# Patient Record
Sex: FEMALE | Race: WHITE | Employment: FULL TIME | URBAN - METROPOLITAN AREA
[De-identification: names, ages, dates, MRNs, and addresses within clinical notes are randomized per-mention and may not be internally consistent; named-entity substitution may affect disease eponyms.]

---

## 2022-03-16 LAB — PAP SMEAR, EXTERNAL: POSITIVE

## 2022-08-22 ENCOUNTER — OFFICE VISIT (OUTPATIENT)
Dept: FAMILY MEDICINE CLINIC | Age: 22
End: 2022-08-22
Payer: COMMERCIAL

## 2022-08-22 VITALS
WEIGHT: 149 LBS | HEIGHT: 69 IN | OXYGEN SATURATION: 99 % | DIASTOLIC BLOOD PRESSURE: 74 MMHG | BODY MASS INDEX: 22.07 KG/M2 | SYSTOLIC BLOOD PRESSURE: 104 MMHG | HEART RATE: 62 BPM

## 2022-08-22 DIAGNOSIS — Z00.00 ANNUAL PHYSICAL EXAM: Primary | ICD-10-CM

## 2022-08-22 PROCEDURE — 99385 PREV VISIT NEW AGE 18-39: CPT | Performed by: PHYSICIAN ASSISTANT

## 2022-08-22 SDOH — HEALTH STABILITY: PHYSICAL HEALTH: ON AVERAGE, HOW MANY MINUTES DO YOU ENGAGE IN EXERCISE AT THIS LEVEL?: 60 MIN

## 2022-08-22 SDOH — HEALTH STABILITY: PHYSICAL HEALTH: ON AVERAGE, HOW MANY DAYS PER WEEK DO YOU ENGAGE IN MODERATE TO STRENUOUS EXERCISE (LIKE A BRISK WALK)?: 7 DAYS

## 2022-08-22 ASSESSMENT — PATIENT HEALTH QUESTIONNAIRE - PHQ9
SUM OF ALL RESPONSES TO PHQ QUESTIONS 1-9: 0
SUM OF ALL RESPONSES TO PHQ QUESTIONS 1-9: 0
SUM OF ALL RESPONSES TO PHQ9 QUESTIONS 1 & 2: 0
1. LITTLE INTEREST OR PLEASURE IN DOING THINGS: 0
SUM OF ALL RESPONSES TO PHQ QUESTIONS 1-9: 0
2. FEELING DOWN, DEPRESSED OR HOPELESS: 0
SUM OF ALL RESPONSES TO PHQ QUESTIONS 1-9: 0

## 2022-08-22 NOTE — PROGRESS NOTES
History and Physical      Lilia Jiménez  YOB: 2000    Date of Service:  8/22/2022    Chief Complaint:   Lilia Jiménez is a 20 y.o. female who presents for complete physical examination. HPI: Overall feeling well. Transferring from pediatrician. I have reviewed the patient's medical history in detail and updated the computerized patient record. Wt Readings from Last 3 Encounters:   08/22/22 149 lb (67.6 kg)     BP Readings from Last 3 Encounters:   08/22/22 104/74       There is no problem list on file for this patient. Preventive Care:  Health Maintenance   Topic Date Due    COVID-19 Vaccine (1) Never done    Varicella vaccine (1 of 2 - 2-dose childhood series) Never done    HPV vaccine (1 - 2-dose series) Never done    HIV screen  Never done    Chlamydia screen  Never done    Hepatitis C screen  Never done    DTaP/Tdap/Td vaccine (1 - Tdap) Never done    Pap smear  Never done    Flu vaccine (1) 09/01/2022    Depression Screen  08/22/2023    Meningococcal (ACWY) vaccine  Completed    Hepatitis A vaccine  Aged Out    Hepatitis B vaccine  Aged Out    Hib vaccine  Aged Out    Pneumococcal 0-64 years Vaccine  Aged Out     Hx abnormal PAP: yes - stable and being monitored  Sexual activity: single partner, contraception - OCP (estrogen/progesterone)   Self-breast exams: yes  Last eye exam: spring 2022  Exercise: yes  Seatbelt use: yes  Calcium/vitamin D supplement: no        There is no immunization history on file for this patient. No Known Allergies  Current Outpatient Medications   Medication Sig Dispense Refill    SPRINTEC 28 0.25-35 MG-MCG per tablet        No current facility-administered medications for this visit. History reviewed. No pertinent past medical history.   Past Surgical History:   Procedure Laterality Date    FOOT SURGERY      x 2    TONSILLECTOMY       Family History   Problem Relation Age of Onset    No Known Problems Father     No Known Problems Brother     No Known Problems Brother      Social History     Socioeconomic History    Marital status: Single     Spouse name: Not on file    Number of children: Not on file    Years of education: Not on file    Highest education level: Not on file   Occupational History    Not on file   Tobacco Use    Smoking status: Never    Smokeless tobacco: Never   Vaping Use    Vaping Use: Never used   Substance and Sexual Activity    Alcohol use: Yes     Alcohol/week: 2.0 - 3.0 standard drinks     Types: 2 - 3 Cans of beer per week    Drug use: Never    Sexual activity: Not on file   Other Topics Concern    Not on file   Social History Narrative    Not on file     Social Determinants of Health     Financial Resource Strain: Not on file   Food Insecurity: Not on file   Transportation Needs: Not on file   Physical Activity: Sufficiently Active    Days of Exercise per Week: 7 days    Minutes of Exercise per Session: 60 min   Stress: Not on file   Social Connections: Not on file   Intimate Partner Violence: Not At Risk    Fear of Current or Ex-Partner: No    Emotionally Abused: No    Physically Abused: No    Sexually Abused: No   Housing Stability: Not on file       Review of Systems:  A comprehensive review of systems was negative except for what was noted in the HPI. Physical Exam:   Vitals:    08/22/22 1305   BP: 104/74   Site: Left Upper Arm   Position: Sitting   Pulse: 62   SpO2: 99%   Weight: 149 lb (67.6 kg)   Height: 5' 8.7\" (1.745 m)     Body mass index is 22.2 kg/m². Constitutional: She is oriented to person, place, and time. She appears well-developed and well-nourished. No distress. HEENT:   Head: Normocephalic and atraumatic. Right Ear: Tympanic membrane, external ear and ear canal normal.   Left Ear: Tympanic membrane, external ear and ear canal normal.   Nose: Nose normal.   Mouth/Throat: Oropharynx is clear and moist, and mucous membranes are normal.  There is no cervical adenopathy.   Eyes: Conjunctivae and extraocular motions are normal. Pupils are equal, round, and reactive to light. Neck: Neck supple. No JVD present. Carotid bruit is not present. No mass and no thyromegaly present. Cardiovascular: Normal rate, regular rhythm, normal heart sounds and intact distal pulses. Exam reveals no gallop and no friction rub. No murmur heard. Pulmonary/Chest: Effort normal and breath sounds normal. No respiratory distress. She has no wheezes, rhonchi or rales. Abdominal: Soft, non-tender. Bowel sounds and aorta are normal. She exhibits no organomegaly, mass or bruit. Musculoskeletal: Normal range of motion, no synovitis. She exhibits no edema. Neurological: She is alert and oriented to person, place, and time. She has normal reflexes. No cranial nerve deficit. Coordination normal.   Skin: Skin is warm and dry. There is no rash or erythema. No suspicious lesions noted. Psychiatric: She has a normal mood and affect. Her speech is normal and behavior is normal. Judgment, cognition and memory are normal.       Assessment/Plan:       Diagnosis Orders   1.  Annual physical exam

## 2022-11-21 ENCOUNTER — OFFICE VISIT (OUTPATIENT)
Dept: FAMILY MEDICINE CLINIC | Age: 22
End: 2022-11-21
Payer: COMMERCIAL

## 2022-11-21 VITALS
HEIGHT: 69 IN | OXYGEN SATURATION: 99 % | WEIGHT: 150 LBS | HEART RATE: 88 BPM | BODY MASS INDEX: 22.22 KG/M2 | DIASTOLIC BLOOD PRESSURE: 76 MMHG | SYSTOLIC BLOOD PRESSURE: 118 MMHG

## 2022-11-21 DIAGNOSIS — R42 EPISODIC LIGHTHEADEDNESS: ICD-10-CM

## 2022-11-21 DIAGNOSIS — R00.2 PALPITATIONS: Primary | ICD-10-CM

## 2022-11-21 PROCEDURE — 36415 COLL VENOUS BLD VENIPUNCTURE: CPT | Performed by: PHYSICIAN ASSISTANT

## 2022-11-21 PROCEDURE — 99213 OFFICE O/P EST LOW 20 MIN: CPT | Performed by: PHYSICIAN ASSISTANT

## 2022-11-21 ASSESSMENT — ENCOUNTER SYMPTOMS
RESPIRATORY NEGATIVE: 1
GASTROINTESTINAL NEGATIVE: 1

## 2022-11-21 NOTE — PROGRESS NOTES
Subjective:      Patient ID: Ricardo Macdonald is a 20 y.o. female. HPI  Patient presents with concerns of lightheadedness/ringing in the ears when she is working out. Has noticed if off and on since [employer] school but it has been more noticeable. She runs all the time and has not issues with that. The only time she noticed it is with danielle intensity alternating activities. Going from floor to standing or rowing at high pace to another activity. She said it feels like her hear can't keep up with her activity. It can last a few minutes and can go away with a quick rest or at times has to stop exercising. Does not happen every time she works. She does notice that it is worse if she is working out in the mornings and has not eaten. She drinks a lot of water. Her HR is typically high if she is working out heavy, can get up to 200. Her resting is 60-80. Can feel palpitations or is aware of her hear beat when it gets elevated. During her half marathon average heart rate was 179. Review of Systems   Constitutional: Negative. Respiratory: Negative. Cardiovascular:  Positive for palpitations. Gastrointestinal: Negative. Genitourinary: Negative. Neurological:  Positive for dizziness and light-headedness. Objective:   Physical Exam  Constitutional:       Appearance: Normal appearance. She is normal weight. Cardiovascular:      Rate and Rhythm: Normal rate and regular rhythm. Heart sounds: Normal heart sounds. Pulmonary:      Effort: Pulmonary effort is normal.      Breath sounds: Normal breath sounds. Skin:     General: Skin is warm and dry. Neurological:      General: No focal deficit present. Mental Status: She is alert and oriented to person, place, and time. Assessment / Plan:          Diagnosis Orders   1. Palpitations  CBC    TSH      2. Episodic lightheadedness        Discussed trying to eat before exercise. Possibly blood sugar and orthostatic BP issues as cause.

## 2022-11-22 DIAGNOSIS — R79.89 LOW TSH LEVEL: Primary | ICD-10-CM

## 2022-11-22 LAB
HCT VFR BLD CALC: 42.1 % (ref 36–48)
HEMOGLOBIN: 13.6 G/DL (ref 12–16)
MCH RBC QN AUTO: 26 PG (ref 26–34)
MCHC RBC AUTO-ENTMCNC: 32.4 G/DL (ref 31–36)
MCV RBC AUTO: 80.3 FL (ref 80–100)
PDW BLD-RTO: 14.8 % (ref 12.4–15.4)
PLATELET # BLD: 302 K/UL (ref 135–450)
PMV BLD AUTO: 8.9 FL (ref 5–10.5)
RBC # BLD: 5.24 M/UL (ref 4–5.2)
TSH SERPL DL<=0.05 MIU/L-ACNC: 0.04 UIU/ML (ref 0.27–4.2)
WBC # BLD: 7.1 K/UL (ref 4–11)

## 2023-01-27 NOTE — PROGRESS NOTES
Patient ID: Elaine Ramírez is a 22 y.o. female      Chief Complaint: Low TSH   Referred by LUCINA [facility]ix     HPI:   Elaine Ramírez is here for initial evaluation of low TSH     TSH 0.04 - Nov 2022     Feel slight headed with work out. Heart rate 200 with work out   Ran half marathon in Oct 2022, heart rate was 179 on average during run   Doing lower intensity these days. Heart rate and and dizziness is better      Energy levels are good   Hair are thinning. She has rosacea   Weight is stable   Has heat intolerance and excessive sweating    She has anxiety since high school. Worse in last year  Has tremors and palpitations. Does not drink coffee     Sleep is poor recently. Difficulty going to sleep    No neck pain, denies compressive neck symptoms   Menstrual cycles are regular. Stopped OCP 1-2 months ago . No plans to conceive     Family history of thyroid disorder: Mother has hypothyroidism     No neck radiation  No Recent iodine loading in form of contrast material for diagnostic studies/cardiac cath  Aleve prn. No food supplements, herbs or medications including Biotin  No recent URTI    Denies smoking     The following portions of the patient's history were reviewed and updated as appropriate:     Family History   Problem Relation Age of Onset    Kidney Disease Mother     Hypothyroidism Mother     No Known Problems Father     No Known Problems Brother     No Known Problems Brother     Breast Cancer Maternal Grandmother     Thyroid Cancer Paternal Grandmother     Heart Disease Paternal Grandfather     Cancer Paternal Grandfather         brain, prostate          Social History     Socioeconomic History    Marital status: Single     Spouse name: Not on file    Number of children: Not on file    Years of education: Not on file    Highest education level: Not on file   Occupational History    Not on file   Tobacco Use    Smoking status: Never    Smokeless tobacco: Never   Vaping Use    Vaping Use: Never used  Substance and Sexual Activity    Alcohol use: Yes     Alcohol/week: 2.0 - 3.0 standard drinks     Types: 2 - 3 Cans of beer per week    Drug use: Never    Sexual activity: Yes     Partners: Male   Other Topics Concern    Not on file   Social History Narrative    Not on file     Social Determinants of Health     Financial Resource Strain: Not on file   Food Insecurity: Not on file   Transportation Needs: Not on file   Physical Activity: Sufficiently Active    Days of Exercise per Week: 7 days    Minutes of Exercise per Session: 60 min   Stress: Not on file   Social Connections: Not on file   Intimate Partner Violence: Not At Risk    Fear of Current or Ex-Partner: No    Emotionally Abused: No    Physically Abused: No    Sexually Abused: No   Housing Stability: Not on file          Past Medical History:   Diagnosis Date    HPV in female         Past Surgical History:   Procedure Laterality Date    FOOT SURGERY      x 2    TONSILLECTOMY          No Known Allergies       Current Outpatient Medications:     doxycycline hyclate (VIBRAMYCIN) 100 MG capsule, TAKE 1 CAPSULE BY MOUTH TWICE A DAY, Disp: , Rfl:      Review of Systems:  Constitutional: Negative for fever, chills. HENT: Negative for congestion, ear pain, rhinorrhea,  sore throat and trouble swallowing. Eyes: Negative for photophobia, redness, itching. Respiratory: Negative for cough, shortness of breath and sputum. Cardiovascular: Negative for chest pain and leg swelling. Gastrointestinal: Negative for nausea, vomiting, abdominal pain, diarrhea, constipation. Endocrine: Negative for polydipsia, polyphagia and polyuria. Genitourinary: Negative for dysuria, urgency, frequency, hematuria and flank pain. Musculoskeletal: Negative for myalgias, back pain, arthralgias. Skin/Nail: Negative for rash, itching. Normal nails. Neurological: Negative for seizures, numbness. Has headaches due to migraines.     Hematological/ Lymph nodes: Negative for adenopathy. Does not bruise/bleed easily. Psychiatric/Behavioral: Negative for suicidal ideas and decreased concentration. Physical Exam:  /84 (Site: Left Upper Arm, Position: Sitting, Cuff Size: Medium Adult)   Pulse 70   Ht 5' 8\" (1.727 m)   Wt 152 lb 9.6 oz (69.2 kg)   LMP 01/09/2023   SpO2 100%   BMI 23.20 kg/m²    Constitutional: Patient is oriented to person, place, and time. Patient appears well-developed and well-nourished. HENT:              Head: Normocephalic and atraumatic. Eyes: Conjunctivae and EOM are normal.     Neck: Normal range of motion. Thyroid looks normal.   Cardiovascular: Normal rate, regular rhythm and normal heart sounds. Pulmonary/Chest: Effort normal and breath sounds normal.   Musculoskeletal: Normal range of motion. Neurological: Patient is alert and oriented to person, place, and time. Patient has normal reflexes. No hand tremors. Skin: Skin is warm and dry. Psychiatric: Patient has a normal mood and affect. Patient behavior is normal.     Lab Review:    Office Visit on 11/21/2022   Component Date Value Ref Range Status    WBC 11/21/2022 7.1  4.0 - 11.0 K/uL Final    RBC 11/21/2022 5.24 (A)  4.00 - 5.20 M/uL Final    Hemoglobin 11/21/2022 13.6  12.0 - 16.0 g/dL Final    Hematocrit 11/21/2022 42.1  36.0 - 48.0 % Final    MCV 11/21/2022 80.3  80.0 - 100.0 fL Final    MCH 11/21/2022 26.0  26.0 - 34.0 pg Final    MCHC 11/21/2022 32.4  31.0 - 36.0 g/dL Final    RDW 11/21/2022 14.8  12.4 - 15.4 % Final    Platelets 44/[id number]  135 - 450 K/uL Final    MPV 11/21/2022 8.9  5.0 - 10.5 fL Final    TSH 11/21/2022 0.04 (A)  0.27 - 4.20 uIU/mL Final   Abstract on 09/07/2022   Component Date Value Ref Range Status    PAP Smear, External 03/16/2022 Positive   Final          Assessment/Plan:     Kelsey Goldstein was seen today for thyroid problem and new patient. Diagnoses and all orders for this visit:    Low TSH level  -     TSH;  Future  -     T4, Free; Future  -     T3, Free; Future  -     Anti-Thyroglobulin Antibody; Future  -     Thyroid Peroxidase Antibody; Future  -     Thyrotropin receptor antibody; Future    Family history of thyroid disease  -     TSH; Future  -     T4, Free; Future  -     T3, Free; Future  -     Anti-Thyroglobulin Antibody; Future  -     Thyroid Peroxidase Antibody; Future  -     Thyrotropin receptor antibody; Future         1: Low TSH  Family history of thyroid disease     Graves vs toxic nodule vs toxic nodule   Check Tfts, TRAB, Thyroid abs   Child bearing age: Check pregnancy test     If TSH low, TRAB negative, will do imaging      Hyperthyroidism medication education (discussed today)   'Reviewed the risks of anti-thyroid drugs including agranulocytosis and hepatitis. If the patient develops a fever, sore throat, jaundice, malaise and/or anorexia, patient is to stop the medicine and call ASAP. Will do trial of methimazole for 9-12 months. If she has flare ups or continued methimazole for longer duration then will consider HUFF or surgery. Side effects of methimazole on fetus are cutis aplasia, congenital malformations, such as tracheoesophageal fistulas, patent vitellointestinal duct, choanal atresia, omphalocele, and omphalomesenteric duct anomaly. Side effects of PTU on fetus are like methimazole but involve head and neck more often.    Risk of miscarriage is twice with hyperthyroidism     Labs today     RTC in 5 weeks     If labs are fine then change to 3 months       Electronically signed by Arun Camejo MD on 1/30/2023 at 3:40 PM

## 2023-01-30 ENCOUNTER — OFFICE VISIT (OUTPATIENT)
Dept: ENDOCRINOLOGY | Age: 23
End: 2023-01-30
Payer: COMMERCIAL

## 2023-01-30 VITALS
HEART RATE: 70 BPM | WEIGHT: 152.6 LBS | DIASTOLIC BLOOD PRESSURE: 84 MMHG | OXYGEN SATURATION: 100 % | BODY MASS INDEX: 23.13 KG/M2 | HEIGHT: 68 IN | SYSTOLIC BLOOD PRESSURE: 138 MMHG

## 2023-01-30 DIAGNOSIS — Z83.49 FAMILY HISTORY OF THYROID DISEASE: ICD-10-CM

## 2023-01-30 DIAGNOSIS — R79.89 LOW TSH LEVEL: ICD-10-CM

## 2023-01-30 DIAGNOSIS — R79.89 LOW TSH LEVEL: Primary | ICD-10-CM

## 2023-01-30 PROCEDURE — 99203 OFFICE O/P NEW LOW 30 MIN: CPT | Performed by: INTERNAL MEDICINE

## 2023-01-30 RX ORDER — DOXYCYCLINE HYCLATE 100 MG/1
CAPSULE ORAL
COMMUNITY
Start: 2023-01-03

## 2023-01-31 LAB
ANTI-THYROGLOB ABS: 261 IU/ML
T3 FREE: 3 PG/ML (ref 2.3–4.2)
T4 FREE: 0.8 NG/DL (ref 0.9–1.8)
THYROID PEROXIDASE (TPO) ABS: 13 IU/ML
TSH SERPL DL<=0.05 MIU/L-ACNC: 2.86 UIU/ML (ref 0.27–4.2)

## 2023-02-01 ENCOUNTER — TELEPHONE (OUTPATIENT)
Dept: ENDOCRINOLOGY | Age: 23
End: 2023-02-01

## 2023-02-01 LAB — TSH RECEPTOR AB: <0.8 IU/L

## 2023-02-01 NOTE — TELEPHONE ENCOUNTER
----- Message from Alesha Crane MD sent at 2/1/2023  1:50 PM EST -----  please change follow up to 3 months     TSH 2.86, Free T 0.8, Free T3 3.0 - Jan 2023   TRAB negative Jan 2023   Anti Tg abs high - Jan 2023

## 2023-05-15 ENCOUNTER — OFFICE VISIT (OUTPATIENT)
Dept: ENDOCRINOLOGY | Age: 23
End: 2023-05-15
Payer: COMMERCIAL

## 2023-05-15 VITALS
SYSTOLIC BLOOD PRESSURE: 110 MMHG | OXYGEN SATURATION: 99 % | WEIGHT: 151.2 LBS | DIASTOLIC BLOOD PRESSURE: 68 MMHG | HEIGHT: 68 IN | HEART RATE: 69 BPM | BODY MASS INDEX: 22.91 KG/M2

## 2023-05-15 DIAGNOSIS — E04.9 GOITER: ICD-10-CM

## 2023-05-15 DIAGNOSIS — Z83.49 FAMILY HISTORY OF THYROID DISEASE: ICD-10-CM

## 2023-05-15 DIAGNOSIS — R79.89 LOW TSH LEVEL: Primary | ICD-10-CM

## 2023-05-15 DIAGNOSIS — R79.89 LOW TSH LEVEL: ICD-10-CM

## 2023-05-15 PROCEDURE — 99213 OFFICE O/P EST LOW 20 MIN: CPT | Performed by: INTERNAL MEDICINE

## 2023-05-15 NOTE — PROGRESS NOTES
11/21/2022 32.4  31.0 - 36.0 g/dL Final    RDW 11/21/2022 14.8  12.4 - 15.4 % Final    Platelets [id number]  135 - 450 K/uL Final    MPV 11/21/2022 8.9  5.0 - 10.5 fL Final    TSH 11/21/2022 0.04 (L)  0.27 - 4.20 uIU/mL Final   Abstract on 09/07/2022   Component Date Value Ref Range Status    PAP Smear, External 03/16/2022 Positive   Final          Assessment/Plan:     Katrin Lopez was seen today for follow-up and thyroid problem. Diagnoses and all orders for this visit:    Low TSH level  -     TSH; Future  -     T4, Free; Future  -     T3, Free; Future  -     US THYROID; Future    Family history of thyroid disease  -     TSH; Future  -     T4, Free; Future  -     T3, Free; Future  -     US THYROID; Future    Goiter  -     TSH;  Future  -     T4, Free; Future  -     T3, Free; Future  -     US THYROID; Future             1: Low TSH, normalized   Family history of thyroid disease     TSH 2.86, Free T 0.8, Free T3 3.0 - Jan 2023   TRAB negative Jan 2023   Anti Tg abs high - Jan 2023     Brenda Odonnell to gradually resume cardio     Check Tfts       Labs today     2: goiter   Will get US       Follow up will be decided after the labs       Electronically signed by Senthil Vidales MD on 5/15/2023 at 4:01 PM

## 2023-05-16 LAB
T3FREE SERPL-MCNC: 2.9 PG/ML (ref 2.3–4.2)
T4 FREE SERPL-MCNC: 0.9 NG/DL (ref 0.9–1.8)
TSH SERPL DL<=0.005 MIU/L-ACNC: 2.03 UIU/ML (ref 0.27–4.2)

## 2023-05-17 ENCOUNTER — HOSPITAL ENCOUNTER (OUTPATIENT)
Dept: ULTRASOUND IMAGING | Age: 23
Discharge: HOME | End: 2023-05-17
Payer: COMMERCIAL

## 2023-05-17 DIAGNOSIS — R79.89 LOW TSH LEVEL: ICD-10-CM

## 2023-05-17 DIAGNOSIS — E04.9 GOITER: ICD-10-CM

## 2023-05-17 DIAGNOSIS — Z83.49 FAMILY HISTORY OF THYROID DISEASE: ICD-10-CM

## 2023-05-17 PROCEDURE — 76536 US EXAM OF HEAD AND NECK: CPT
